# Patient Record
(demographics unavailable — no encounter records)

---

## 2018-01-01 NOTE — PHYSICIAN PROGRESS NOTE
DAILY NOTE



Name: RICARDO DOLAN                               Medical Record Number: [id number]

Note Date: 2018                             Date/Time: 2018 09:35:00



No Spells overnight



DOL: 8    Pos-Mens Age: 34wk 1d    Birth Gest: 33wk 0d      : 2018

Birth Weight: 1700 (gms)



DAILY PHYSICAL EXAM

Todays Weight: 1668 (gms)         Chg 24 hrs: --      Chg 7 days: --

Head Circ: 27.5 (cm)               Date: 2018    Change: -2.5 (cm)



Temperature   Heart Rate Resp Rate  BP - Sys   BP - Canas  BP - Mean  O2 Sats

98.9          156        40         69         35         46         99

Intensive cardiac and respiratory monitoring, continuous and/or frequent vital

sign monitoring.



Bed Type:      Open Crib

General:       The infant is alert and active.

Head/Neck:     Anterior fontanelle is soft and flat. No oral lesions.

Chest:         Clear, equal breath sounds.

Heart:         Regular rate and rhythm, without murmur. Pulses are normal.

Abdomen:       Soft and flat. No hepatosplenomegaly. Normal bowel sounds.

Genitalia:     Normal external genitalia are present. Male

Extremities:   No deformities noted.  Normal range of motion for all

               extremities. Hips show no evidence of instability.

Neurologic:    Normal tone and activity.

Skin:          The skin is pink and well perfused.  No rashes, vesicles, or

               other lesions are noted.



MEDICATIONS

Active         Start Date Start Time      Stop Date  Dur(d) Comment

ADEK           2018                            4



RESPIRATORY SUPPORT

Respiratory Support      Start Date Stop Date  Dur(d) Comment

Room Air                 2018            4



PROCEDURES

Procedures          Start Date Stop Date  Dur(d)  Clinician      Comment

Procedures



Procedures



Procedures

UVC                 2018 6       Cheryl Cedillo   low lying

                                                  MD

Procedures

Volume Bolus        2018 1                      10mL/kg x 2.

                                                                 NS for

                                                                 metabolic

                                                                 acidosis



CULTURES

ACTIVE

Type            Date       Results        Organism       Comment:

Blood           2018 No Growth



INTAKE/OUTPUT

Fluid Type        Sinan/oz     Dex % Prot g/kg Prot g/100mL Amt  Comment

NeoSure           22                                      245





Number of Voids: 10



Total Output:

Stools: 5 Last Stool: 2018





NUTRITIONAL SUPPORT

Diagnosis                Start Date End Date

Nutritional Support      2018



History

33 weeker intubated in delivery room for poor resp effort. initial glucose <

20. D10 bolus given. Mother was on Mag

Plan

Continue feeds ad aj min 30mL q3H

RESPIRATORY DISTRESS SYNDROME

Diagnosis                Start Date End Date

Pulmonary Immaturity     2018



History

33 weeker born via stat . Inadequate  steroids. intubated in

delivery room for poor resp effort s/p Infasurf x 1. extubated day 2 to CPAP,

transitioned to nasal cannula and weaned to room air day 5

Plan

Wean to room air as tolerated

PREMATURITY

Diagnosis                Start Date End Date

Prematurity 5700-1540 gm 2018



History

33 weeker born via  for preeclampsia and NRFHT, floppy at delivery

with significant metabolic acidosis. Normal neuro exam after volume

resuscitation and correction of hypoglycemia.  loaded with caffeine on day1.

improved acidosis < 12 hours.

Plan

developmentally appropriate care

d/c scheduled bili checks

ready for d/c when at least 1800g

HEALTH MAINTENANCE

MATERNAL LABS

RPR/Serology: Non-Reactive HIV: Negative Rubella: Immune GBS: Unknown

HBsAg: Negative



 SCREENING

Date                 Comment

2018 Ordered



Parental Contact

Updated





_______________________________________

Good Carbajal MD

## 2018-01-01 NOTE — XRAY REPORT
FINAL REPORT



EXAM:  XR ABDOMEN 1V AP



HISTORY:  line placement 



TECHNIQUE:  KUB view(s) of abdomen. 



PRIORS:  None.



FINDINGS:  

Apparent umbilical venous catheter tip coiled over thoracolumbar

junction, with tip directed caudally and projected over L1 level,

which may need repositioning. 



No significant bowel dilatation, pneumatosis or apparent

pneumoperitoneum. 



No abnormal calcifications.  



Osseous structures grossly unremarkable. 



IMPRESSION:  

1. Umbilical venous catheter position as reported. 



2. Nonobstructive bowel gas pattern.

## 2018-01-01 NOTE — PHYSICIAN PROGRESS NOTE
DAILY NOTE



Name: RICARDO DOLAN                               Medical Record Number: [id number]

Note Date: 2018                             Date/Time: 2018 10:06:00



DOL: 6    Pos-Mens Age: 33wk 6d    Birth Gest: 33wk 0d      : 2018

Birth Weight: 1700 (gms)



DAILY PHYSICAL EXAM

Todays Weight: 1668 (gms)         Chg 24 hrs: --      Chg 7 days: --



Temperature   Heart Rate Resp Rate  BP - Sys   BP - Canas  BP - Mean  O2 Sats

98.8          157        46         64         32         42         98

Intensive cardiac and respiratory monitoring, continuous and/or frequent vital

sign monitoring.



Bed Type:      Open Crib

General:       The infant is alert and active.

Head/Neck:     Anterior fontanelle is soft and flat.

Chest:         Clear, equal breath sounds.

Heart:         Regular rate and rhythm, without murmur. Pulses are normal.

Abdomen:       Soft and flat. No hepatosplenomegaly. Normal bowel sounds.

Genitalia:     Normal external genitalia are present.

Extremities:   No deformities noted.

Neurologic:    Normal tone and activity.

Skin:          The skin is pink and well perfused.



MEDICATIONS

Active         Start Date Start Time      Stop Date  Dur(d) Comment

ADEK           2018                            2



RESPIRATORY SUPPORT

Respiratory Support      Start Date Stop Date  Dur(d) Comment

Room Air                 2018            2



PROCEDURES

Procedures          Start Date Stop Date  Dur(d)  Clinician      Comment

Procedures



Procedures



Procedures

U[facility]                 2018 6       Cheryl Cedillo   low lying

                                                  MD

Procedures

Volume Bolus        2018 1                      10mL/kg x 2.

                                                                 NS for

                                                                 metabolic

                                                                 acidosis



LABS

Liver Function  Time    T Bili  D Bili  Blood Type Pop  AST     ALT

18                6.80 mg/

GGT     LDH     NH3     Lactate



CULTURES

ACTIVE

Type            Date       Results        Organism       Comment:

Blood           2018 No Growth



INTAKE/OUTPUT

Fluid Type        Sinan/oz     Dex % Prot g/kg Prot g/100mL Amt  Comment

IV Fluids                    10                           23

NeoSure           22                                      233



Route: PO



PLANNED INTAKE

FLUID TYPE: NEOSURE

Sinan/oz   Dex %    Prot g/kg Prot g/100mL Amt  mL/feed feeds/day mL/hr mL/kg/da

22                                       240  30      8               143.88





Comment ad aj or EBM



Urine Amount: 59 mL   1.5 mL/kg/hr

Calculation: 24 hrs

Number of Voids: 7



Total Output:

   59 mL     1.5 mL/kg/hr             35.4 mL/kg/day

Calculation: 24 hrs

Stools: 6





NUTRITIONAL SUPPORT

Diagnosis                Start Date End Date

Nutritional Support      2018



History

33 weeker intubated in delivery room for poor resp effort. initial glucose <

20. D10 bolus given. Mother was on Mag

Assessment

tolerated feeds withut any issues overnight

Plan

Increase feeds ad aj min 30mL q3H

RESPIRATORY DISTRESS SYNDROME

Diagnosis                Start Date End Date

Respiratory Distress     2018

Syndrome

Pulmonary Immaturity     2018



History

33 weeker born via stat . Inadequate  steroids. intubated in

delivery room for poor resp effort s/p Infasurf x 1. extubated day 2 to CPAP,

transitioned to nasal cannula and weaned to room air day 5

Assessment

weaned to room air and tolerated well. No events

Plan

Wean to room air as tolerated

[id number]

Diagnosis                Start Date End Date

Haysrt-onrjupu-logpfrysh 2018



History

33 weeker born via  for preeclampsia and NRFHT, floppy at delivery

with significant metabolic acidosis- base def -17. : CBCd , no left shift.

improved clinical status after volume resuscitation, base deficit improved to

-7. Blood cx neg. Off antibiotics and stable clinical status

Plan

F/U bld cx till final

PREMATURITY

Diagnosis                Start Date End Date

Prematurity 7861-2345 gm 2018



History

33 weeker born via  for preeclampsia and NRFHT, floppy at delivery

with significant metabolic acidosis. Normal neuro exam after volume

resuscitation and correction of hypoglycemia.  loaded with caffeine on day1.

improved acidosis < 12 hours.

Assessment

bili this am 6.7

Plan

developmentally appropriate care

daily TCB and send serum if >8.5

HEALTH MAINTENANCE

MATERNAL LABS

RPR/Serology: Non-Reactive HIV: Negative Rubella: Immune GBS: Unknown

HBsAg: Negative



 SCREENING

Date                 Comment

2018 Ordered



Parental Contact

Updated  - still admitted. called unit last night





_______________________________________

Cheryl Cedillo MD

## 2018-01-01 NOTE — PHYSICIAN PROGRESS NOTE
DAILY NOTE



Name: RICARDO DOLAN                               Medical Record Number: [id number]

Note Date: 2018                             Date/Time: 2018 10:05:00



No Spells overnight



DOL: 10   Pos-Mens Age: 34wk 3d    Birth Gest: 33wk 0d      : 2018

Birth Weight: 1700 (gms)



DAILY PHYSICAL EXAM

Todays Weight: 1697 (gms)         Chg 24 hrs: --      Chg 7 days: --

Head Circ: 29 (cm)                 Date: 2018    Change: 0 (cm)



Temperature   Heart Rate Resp Rate  BP - Sys   BP - Canas  BP - Mean  O2 Sats

98.3          159        45         78         30         48         96

Intensive cardiac and respiratory monitoring, continuous and/or frequent vital

sign monitoring.



Bed Type:      Open Crib

General:       The infant is alert and active.

Head/Neck:     Anterior fontanelle is soft and flat. No oral lesions.

Chest:         Clear, equal breath sounds.

Heart:         Regular rate and rhythm, without murmur. Pulses are normal.

Abdomen:       Soft and flat. No hepatosplenomegaly. Normal bowel sounds.

Genitalia:     Normal external genitalia are present.

Extremities:   No deformities noted.  Normal range of motion for all

               extremities. Hips show no evidence of instability.

Neurologic:    Normal tone and activity.

Skin:          The skin is pink and well perfused.  No rashes, vesicles, or

               other lesions are noted.



MEDICATIONS

Active         Start Date Start Time      Stop Date  Dur(d) Comment

ADEK           2018                            6



RESPIRATORY SUPPORT

Respiratory Support      Start Date Stop Date  Dur(d) Comment

Room Air                 2018            6



PROCEDURES

Procedures          Start Date Stop Date  Dur(d)  Clinician      Comment

Procedures



Procedures



Procedures

UVC                 2018 6       Cheryl Cedillo   low lying

                                                  MD

Procedures

Volume Bolus        2018 1                      10mL/kg x 2.

                                                                 NS for

                                                                 metabolic

                                                                 acidosis



LABS

Liver Function  Time    T Bili  D Bili  Blood Type Pop  AST     ALT

18                4.90 mg/

GGT     LDH     NH3     Lactate



CULTURES

ACTIVE

Type            Date       Results        Organism       Comment:

Blood           2018 No Growth



INTAKE/OUTPUT

Fluid Type        Sinan/oz     Dex % Prot g/kg Prot g/100mL Amt  Comment

NeoSure           22                                      243





Number of Voids: 10



Total Output:

Stools: 6 Last Stool: 2018





NUTRITIONAL SUPPORT

Diagnosis                Start Date End Date

Nutritional Support      2018



History

33 weeker intubated in delivery room for poor resp effort. initial glucose <

20. D10 bolus given. Mother was on Mag

Plan

Continue feeds ad aj min 34mL q3H (160cc/kg/day)

RESPIRATORY DISTRESS SYNDROME

Diagnosis                Start Date End Date

Pulmonary Immaturity     2018



History

33 weeker born via stat . Inadequate  steroids. intubated in

delivery room for poor resp effort s/p Infasurf x 1. extubated day 2 to CPAP,

transitioned to nasal cannula and weaned to room air day 5

Plan

Wean to room air as tolerated

PREMATURITY

Diagnosis                Start Date End Date

Prematurity 6979-9568 gm 2018



History

33 weeker born via  for preeclampsia and NRFHT, floppy at delivery

with significant metabolic acidosis. Normal neuro exam after volume

resuscitation and correction of hypoglycemia.  loaded with caffeine on day1.

improved acidosis < 12 hours.

Plan

developmentally appropriate care

d/c scheduled bili checks

ready for d/c when at least 1800g

HEALTH MAINTENANCE

MATERNAL LABS

RPR/Serology: Non-Reactive HIV: Negative Rubella: Immune GBS: Unknown

HBsAg: Negative



 SCREENING

Date                 Comment

2018 Ordered



Parental Contact

Updated





_______________________________________

Good Carbajal MD

## 2018-01-01 NOTE — PHYSICIAN PROGRESS NOTE
DAILY NOTE



Name: RICARDO DOALN                               Medical Record Number: [id number]

Note Date: 2018                             Date/Time: 2018 10:32:00



DOL: 4    Pos-Mens Age: 33wk 4d    Birth Gest: 33wk 0d      : 2018

Birth Weight: 1700 (gms)



DAILY PHYSICAL EXAM

Todays Weight: 1614 (gms)         Chg 24 hrs: --      Chg 7 days: --



Temperature   Heart Rate Resp Rate  BP - Sys   BP - Canas  BP - Mean  O2 Sats

98.8          136        36         64         36         45         95

Intensive cardiac and respiratory monitoring, continuous and/or frequent vital

sign monitoring.



Bed Type:      Radiant Warmer

General:       The infant is alert and active.

Head/Neck:     Anterior fontanelle is soft and flat. NC in place

Chest:         Clear, equal breath sounds.

Heart:         Regular rate and rhythm, without murmur. Pulses are normal.

Abdomen:       Distended abdomen, soft, normal bowel sounds. No

               hepatosplenomegaly. Normal bowel sounds.

Genitalia:     Normal external genitalia are present.

Extremities:   No deformities noted.

Neurologic:    Normal tone and activity.

Skin:          The skin is pink and well perfused.



RESPIRATORY SUPPORT

Respiratory Support      Start Date Stop Date  Dur(d) Comment

High Flow Nasal Cannula  2018 3

delivering CPAP

Nasal Cannula            2018            1



SETTINGS FOR NASAL CANNULA

FiO2           Flow (lpm)

0.21           1



SETTINGS FOR HIGH FLOW NASAL CANNULA DELIVERING CPAP

FiO2      Flow (lpm)

0.21      2



PROCEDURES

Procedures          Start Date Stop Date  Dur(d)  Clinician      Comment

Procedures

UVC                 2018            5       Cheryl Cedillo   low lying

                                                  MD

Procedures

Volume Bolus        2018            5                      10mL/kg x 2.

                                                                 NS for

                                                                 metabolic

                                                                 acidosis



CULTURES

ACTIVE

Type            Date       Results        Organism       Comment:

Blood           2018 No Growth



INTAKE/OUTPUT

Fluid Type        Sinan/oz     Dex % Prot g/kg Prot g/100mL Amt  Comment

IV Fluids                    10                           94

Other - IV                                                12   meds and flushes

NeoSure           22                                      112



Weight Used for calculations: 1700 grams

Route: PO



PLANNED INTAKE

FLUID TYPE: IV FLUIDS

Sinan/oz   Dex %    Prot g/kg Prot g/100mL Amt  mL/feed feeds/day mL/hr mL/kg/da

         10                              108                    4.5   63.53

FLUID TYPE: NEOSURE

Sinan/oz   Dex %    Prot g/kg Prot g/100mL Amt  mL/feed feeds/day mL/hr mL/kg/da

22                                       120                          70.59





Comment or EBM

FLUID TYPE: SALINE - 1/2 NORMAL

Sinan/oz   Dex %    Prot g/kg Prot g/100mL Amt  mL/feed feeds/day mL/hr mL/kg/da

                                         12                     0.5   7.06



Urine Amount: 108 mL   2.6 mL/kg/hr

Calculation: 24 hrs



Total Output:

   108 mL    2.6 mL/kg/hr             63.5 mL/kg/day

Calculation: 24 hrs

Stools: 4





NUTRITIONAL SUPPORT

Diagnosis                Start Date End Date

Nutritional Support      2018



History

33 weeker intubated in delivery room for poor resp effort. initial glucose <

20. D10 bolus given. Mother was on Mag

Assessment

Has been tolerating feeds. this am abdominal girth increased by 2cm, however

soft, non tender, active bowel sounds

Plan

Continue feeds 20mL q4. Neosure /EBM. PO/NG - No increase today

IVF @ 140ml/kg/day

Monitor closely

RESPIRATORY DISTRESS SYNDROME

Diagnosis                Start Date End Date

Respiratory Distress     2018

Syndrome



History

33 weeker born via stat . Inadequate  steroids. intubated in

delivery room for poor resp effort s/p Infasurf x 1

Assessment

stable on 1L NC 21%

Plan

Monitor and wean HFNC as tolerated

[id number]

Diagnosis                Start Date End Date

Pmfutp-uhogehl-iksurooyg 2018



History

33 weeker born via  for preeclampsia and NRFHT, floppy at delivery

with significant metabolic acidosis- base def -17. : CBCd , no left shift.

improved clinical status after volume resuscitation, base deficit improved to

-7

Assessment

Amp and gent dced and stable

Plan

F/U bld cx till final

PREMATURITY

Diagnosis                Start Date End Date

Prematurity 0440-4111 gm 2018



History

33 weeker born via  for preeclampsia and NRFHT, floppy at delivery

with significant metabolic acidosis. Normal neuro exam after volume

resuscitation and correction of hypoglycemia.  loaded with caffeine on day1.

improved acidosis < 12 hours.

Assessment

tcb: 7.2

Plan

developmentally appropriate care

daily TCB and send serum if >8.5

HEALTH MAINTENANCE

MATERNAL LABS

RPR/Serology: Non-Reactive HIV: Negative Rubella: Immune GBS: Unknown

HBsAg: Negative



 SCREENING

Date                 Comment

2018 Ordered



Parental Contact

Updated





_______________________________________

Cheryl Cedillo MD

## 2018-01-01 NOTE — PHYSICIAN PROGRESS NOTE
DAILY NOTE



Name: RICARDO DOLAN                               Medical Record Number: [id number]

Note Date: 2018                             Date/Time: 2018 10:00:00



DOL: 5    Pos-Mens Age: 33wk 5d    Birth Gest: 33wk 0d      : 2018

Birth Weight: 1700 (gms)



DAILY PHYSICAL EXAM

Todays Weight: Deferred (gms)     Chg 24 hrs: --      Chg 7 days: --



Temperature   Heart Rate Resp Rate  BP - Sys   BP - Canas  BP - Mean  O2 Sats

98.5          132        47         58         34         42         94

Intensive cardiac and respiratory monitoring, continuous and/or frequent vital

sign monitoring.



Bed Type:      Radiant Warmer

General:       The infant is alert and active.

Head/Neck:     Anterior fontanelle is soft and flat. NC in place

Chest:         Clear, equal breath sounds.

Heart:         Regular rate and rhythm, without murmur. Pulses are normal.

Abdomen:       Soft and flat. No hepatosplenomegaly. Normal bowel sounds.

Genitalia:     Normal external genitalia are present.

Extremities:   No deformities noted.

Neurologic:    Normal tone and activity.

Skin:          The skin is pink and well perfused.



MEDICATIONS

Active         Start Date Start Time      Stop Date  Dur(d) Comment

ADEK           2018                            1



RESPIRATORY SUPPORT

Respiratory Support      Start Date Stop Date  Dur(d) Comment

Nasal Cannula            2018            2



SETTINGS FOR NASAL CANNULA

FiO2           Flow (lpm)

0.21           0.75



PROCEDURES

Procedures          Start Date Stop Date  Dur(d)  Clinician      Comment

Procedures

[facility]                 2018            6       Cheryl Cedillo   low lying

                                                  MD

Procedures

Volume Bolus        2018            6                      10mL/kg x 2.

                                                                 NS for

                                                                 metabolic

                                                                 acidosis



LABS

Liver Function  Time    T Bili  D Bili  Blood Type Pop  AST     ALT

18                6.80 mg/

GGT     LDH     NH3     Lactate



CULTURES

ACTIVE

Type            Date       Results        Organism       Comment:

Blood           2018 No Growth



INTAKE/OUTPUT

Fluid Type        Sinan/oz     Dex % Prot g/kg Prot g/100mL Amt  Comment

IV Fluids                    10                           112

NeoSure           22                                      140



Weight Used for calculations: 1614 grams

Route: PO



PLANNED INTAKE

FLUID TYPE: NEOSURE

Sinan/oz   Dex %    Prot g/kg Prot g/100mL Amt  mL/feed feeds/day mL/hr mL/kg/da

22                                       200  25      8               123.92





Comment ad aj or EBM



Urine Amount: 139 mL   3.6 mL/kg/hr

Calculation: 24 hrs



Total Output:

   139 mL    3.6 mL/kg/hr             86.1 mL/kg/day

Calculation: 24 hrs

Stools: 6





NUTRITIONAL SUPPORT

Diagnosis                Start Date End Date

Nutritional Support      2018



History

33 weeker intubated in delivery room for poor resp effort. initial glucose <

20. D10 bolus given. Mother was on Mag

Assessment

tolerated feeds withut any issues overnight

Plan

Increase feeds ad aj min 25mL q3H

RESPIRATORY DISTRESS SYNDROME

Diagnosis                Start Date End Date

Respiratory Distress     2018

Syndrome



History

33 weeker born via stat . Inadequate  steroids. intubated in

delivery room for poor resp effort s/p Infasurf x 1

Assessment

weaning resp support

Plan

Wean to room air as tolerated

[id number]

Diagnosis                Start Date End Date

Ebbbgg-phfmzjx-pyumuifzm 2018



History

33 weeker born via  for preeclampsia and NRFHT, floppy at delivery

with significant metabolic acidosis- base def -17. : CBCd , no left shift.

improved clinical status after volume resuscitation, base deficit improved to

-7

Plan

F/U bld cx till final

PREMATURITY

Diagnosis                Start Date End Date

Prematurity 4912-8474 gm 2018



History

33 weeker born via  for preeclampsia and NRFHT, floppy at delivery

with significant metabolic acidosis. Normal neuro exam after volume

resuscitation and correction of hypoglycemia.  loaded with caffeine on day1.

improved acidosis < 12 hours.

Assessment

bili this am 6.8

Plan

developmentally appropriate care

daily TCB and send serum if >8.5

HEALTH MAINTENANCE

MATERNAL LABS

RPR/Serology: Non-Reactive HIV: Negative Rubella: Immune GBS: Unknown

HBsAg: Negative



 SCREENING

Date                 Comment

2018 Ordered



Parental Contact

Updated





_______________________________________

Cheryl Cedillo MD

## 2018-01-01 NOTE — XRAY REPORT
FINAL REPORT



EXAM:  XR CHEST 1V AP



HISTORY:  tube placement 



TECHNIQUE:  Single, portable chest x-ray.



PRIORS:  None.



FINDINGS:  

ET tube tip projects approximately 1 cm above the truong.

Additional umbilical catheter will be further evaluated on

abdomen radiograph. 



Cardiothymic silhouette within normal limits. 



Lungs show increased interstitial opacities in diffuse hazy

opacification bilaterally. No apparent pneumothorax. 



IMPRESSION:  

1. ET tube position as reported. 



2. Findings which may represent retained fluid, interstitial

edema or TTN. Correlate clinically.

## 2018-01-01 NOTE — PHYSICIAN PROGRESS NOTE
DAILY NOTE



Name: RICARDO DOLAN                               Medical Record Number: [id number]

Note Date: 2018                             Date/Time: 2018 11:31:00



DOL: 16   Pos-Mens Age: 35wk 2d    Birth Gest: 33wk 0d      : 2018

Birth Weight: 1700 (gms)



DAILY PHYSICAL EXAM

Todays Weight: 1807 (gms)         Chg 24 hrs: --      Chg 7 days: 110

Head Circ: 30.5 (cm)               Date: 2018    Change: 1.5 (cm)

Length: 44.5 (cm)   Change: 1.3 (cm)



Temperature   Heart Rate Resp Rate  BP - Sys   BP - Canas  BP - Mean  O2 Sats

98.4          170        35         76         50         58         96

Intensive cardiac and respiratory monitoring, continuous and/or frequent vital

sign monitoring.



Bed Type:      Open Crib

General:       The infant is alert and active.

Head/Neck:     Anterior fontanelle is soft and flat. NG in place

Chest:         Clear, equal breath sounds.

Heart:         Regular rate and rhythm, without murmur. Pulses are normal.

Abdomen:       Soft and flat. No hepatosplenomegaly. Normal bowel sounds.

Genitalia:     Normal external genitalia are present.

Extremities:   No deformities noted.

Neurologic:    Normal tone and activity.

Skin:          The skin is pink and well perfused.



MEDICATIONS

Active         Start Date Start Time      Stop Date  Dur(d) Comment

Multivitamins  2018                            4

with Iron



RESPIRATORY SUPPORT

Respiratory Support      Start Date Stop Date  Dur(d) Comment

Room Air                 2018            12



PROCEDURES

Procedures          Start Date Stop Date  Dur(d)  Clinician      Comment

Procedures



Procedures



Procedures

[facility]                 2018 6       Cheryl Cedillo   low lying

                                                  MD

Procedures

Volume Bolus        2018 1                      10mL/kg x 2.

                                                                 NS for

                                                                 metabolic

                                                                 acidosis



CULTURES

ACTIVE

Type            Date       Results        Organism       Comment:

Blood           2018 No Growth



INTAKE/OUTPUT

Fluid Type        Sinan/oz     Dex % Prot g/kg Prot g/100mL Amt  Comment

NeoSure                                                 272



Route: NG/PO



PLANNED INTAKE

FLUID TYPE: NEOSURE

Sinan/oz   Dex %    Prot g/kg Prot g/100mL Amt  mL/feed feeds/day mL/hr mL/kg/da

22                                       272  34      8               150.53



Number of Voids: 8



Total Output:

Stools: 4 Last Stool: 2018





NUTRITIONAL SUPPORT

Diagnosis                Start Date End Date

Nutritional Support      2018



History

33 weeker intubated in delivery room for poor resp effort. initial glucose <

20. D10 bolus given. Mother was on Mag

Assessment

tolerating feeds. 100% PO over the past 24 hours

Plan

Continue feeds ad aj min 34mL q3H

RESPIRATORY DISTRESS SYNDROME

Diagnosis                Start Date End Date

Pulmonary Immaturity     2018



History

33 weeker born via stat . Inadequate  steroids. intubated in

delivery room for poor resp effort s/p Infasurf x 1. extubated day 2 to CPAP,

transitioned to nasal cannula and weaned to room air day 5

Assessment

No events over 24 hours. Last andrés 

Plan

Continue to monitor

PREMATURITY

Diagnosis                Start Date End Date

Prematurity 7434-1423 gm 2018



History

33 weeker born via  for preeclampsia and NRFHT, floppy at delivery

with significant metabolic acidosis. Normal neuro exam after volume

resuscitation and correction of hypoglycemia.  loaded with caffeine on day1.

improved acidosis < 12 hours.

Assessment

stable in room air. working on PO feeds

Plan

developmentally appropriate care

HEALTH MAINTENANCE

MATERNAL LABS

RPR/Serology: Non-Reactive HIV: Negative Rubella: Immune GBS: Unknown

HBsAg: Negative



 SCREENING

Date                 Comment

2018 Ordered



HEARING SCREEN

Date                Type      Results   Comment

2018 Done               Passed



IMMUNIZATION

Date                Type                Comment

2018 Ordered  Hepatitis B



Parental Contact

Updated





_______________________________________

Cheryl Cedillo MD

## 2018-01-01 NOTE — PHYSICIAN PROGRESS NOTE
DAILY NOTE



Name: RICARDO DOLAN                               Medical Record Number: [id number]

Note Date: 2018                             Date/Time: 2018 10:15:00



DOL: 2    Pos-Mens Age: 33wk 2d    Birth Gest: 33wk 0d      : 2018

Birth Weight: 1700 (gms)



DAILY PHYSICAL EXAM

Todays Weight: 1527 (gms)         Chg 24 hrs: --      Chg 7 days: --



Temperature   Heart Rate Resp Rate  BP - Sys   BP - Canas  BP - Mean  O2 Sats

98.4          123        57         69         39         49         99

Intensive cardiac and respiratory monitoring, continuous and/or frequent vital

sign monitoring.



Bed Type:      Radiant Warmer

General:       The infant is alert and active.

Head/Neck:     Anterior fontanelle is soft and flat. JAMES cannula in place

Chest:         Clear, equal breath sounds.

Heart:         Regular rate and rhythm, without murmur. Pulses are normal.

Abdomen:       Soft and flat. No hepatosplenomegaly. Normal bowel sounds.

Genitalia:     Normal external genitalia are present.

Extremities:   No deformities noted.

Neurologic:    Normal tone and activity.

Skin:          The skin is pink and well perfused.



MEDICATIONS

Active         Start Date Start Time      Stop Date  Dur(d) Comment

Ampicillin     2018                            3

Gentamicin     2018                            3



RESPIRATORY SUPPORT

Respiratory Support      Start Date Stop Date  Dur(d) Comment

Nasal CPAP               2018 2

High Flow Nasal Cannula  2018            1

delivering CPAP



SETTINGS FOR NASAL CPAP

FiO2           CPAP

0.21           6



SETTINGS FOR HIGH FLOW NASAL CANNULA DELIVERING CPAP

FiO2      Flow (lpm)

0.21      3



PROCEDURES

Procedures          Start Date Stop Date  Dur(d)  Clinician      Comment

Procedures

[facility]                 2018            3       Cheryl Cedillo   low lying

                                                  MD

Procedures

Volume Bolus        2018            3                      10mL/kg x 2.

                                                                 NS for

                                                                 metabolic

                                                                 acidosis



LABS

CBC      Time  WBC     Hgb     Hct     Plts    Segs    Bands   Lymph   Mono

18 19:45 8.1 K/mm15.3 gm/44.3 %  244 K/mm59.0 %  0 %     30.0 %  10.0 %

Eos     Baso    Imm     nRBC    Retic

        1.0 %           20.0 %



Chem1    Time    Na      K       Cl      CO2     BUN     Cr      Glu

18 19:45   145 mmol3.5     107.3   17 mmol/21 mg/dL        56 mg/dL

BS Glu  Ca

        8.4 mg/d



Liver Function  Time    T Bili  D Bili  Blood Type Pop  AST     ALT

18        19:45   3.60 mg/                           171 unit62 units

GGT     LDH     NH3     Lactate



Chem2    Time    iCa     Osm     Phos    Mg      TG      Alk Phos T Prot

18 19:45                                           262 units5.9 g/dL

Alb     Pre Alb

3.5 g/dL



Infectious Disease Time  CRP     HepA Ab  HepB cAb HepB sAg HepC PCR  HepC Ab

18           19:45 0.70 mg/



CULTURES

ACTIVE

Type            Date       Results        Organism       Comment:

Blood           2018 Pending



INTAKE/OUTPUT

Fluid Type        Sinan/oz     Dex % Prot g/kg Prot g/100mL Amt  Comment

IV Fluids                    10                           147

Other - IV                                                11   meds and flushes



Route: OG/PO



PLANNED INTAKE

FLUID TYPE: NEOSURE

Sinan/oz   Dex %    Prot g/kg Prot g/100mL Amt  mL/feed feeds/day mL/hr mL/kg/da

22                                       80   10      8               52.39





Comment or EBM

FLUID TYPE: IV FLUIDS

Sinan/oz   Dex %    Prot g/kg Prot g/100mL Amt  mL/feed feeds/day mL/hr mL/kg/da

         10                              96                     4     62.87

FLUID TYPE: SALINE - 1/2 NORMAL

Sinan/oz   Dex %    Prot g/kg Prot g/100mL Amt  mL/feed feeds/day mL/hr mL/kg/da

                                         12                     0.5   7





NUTRITIONAL SUPPORT

Diagnosis                Start Date End Date

Nutritional Support      2018



History

33 weeker intubated in delivery room for poor resp effort. initial glucose <

20. D10 bolus given. Mother was on Mag

Assessment

extubated and stable, alert and active

Plan

Initiate feeds 10mL/q3. Neosure /EBM. PO/NG

IVF @ 120ml/kg/day

RESPIRATORY DISTRESS SYNDROME

Diagnosis                Start Date End Date

Respiratory Distress     2018

Syndrome



History

33 weeker born via stat . Inadequate  steroids. intubated in

delivery room for poor resp effort s/p Infasurf x 1

Assessment

extubated to CPAP on 21%FiO2 and transitioned to HFNC this am

Plan

Monitor and wean HFNC as tolerated

[id number]

Diagnosis                Start Date End Date

Afwxic-xsqgbzc-iitiyvrch 2018



History

33 weeker born via  for preeclampsia and NRFHT, floppy at delivery

with significant metabolic acidosis- base def -17. 1: CBCd , no left shift.

improved clinical status after volume resuscitation, base deficit improved to

-7

Assessment

stable clinical status, bld cx pending. repeat cbcd and crp benign

Plan

D/C amp and gent if cx negative after 48 hours

F/U bld cx

PREMATURITY

Diagnosis                Start Date End Date

Prematurity 7095-4860 gm 2018



History

33 weeker born via  for preeclampsia and NRFHT, floppy at delivery

with significant metabolic acidosis. Normal neuro exam after volume

resuscitation and correction of hypoglycemia.  loaded with caffeine on day1.

improved acidosis < 12 hours.

Plan

developmentally appropriate care

daily TCB and send serum if >8.5

HEALTH MAINTENANCE

MATERNAL LABS

RPR/Serology: Non-Reactive HIV: Negative Rubella: Immune GBS: Unknown

HBsAg: Negative



 SCREENING

Date                 Comment

2018 Ordered



Parental Contact

Updated





_______________________________________

Cheryl Cedillo MD

## 2018-01-01 NOTE — PHYSICIAN PROGRESS NOTE
DAILY NOTE



Name: RICARDO DOLAN                               Medical Record Number: [id number]

Note Date: 2018                             Date/Time: 2018 10:09:00



DOL: 3    Pos-Mens Age: 33wk 3d    Birth Gest: 33wk 0d      : 2018

Birth Weight: 1700 (gms)



DAILY PHYSICAL EXAM

Todays Weight: Deferred (gms)     Chg 24 hrs: --      Chg 7 days: --



Temperature   Heart Rate Resp Rate  BP - Sys   BP - Canas  BP - Mean  O2 Sats

97.8          127        58         59         32         39         100

Intensive cardiac and respiratory monitoring, continuous and/or frequent vital

sign monitoring.



Bed Type:      Radiant Warmer

General:       The infant is alert and active.

Head/Neck:     Anterior fontanelle is soft and flat. NC in place

Chest:         Clear, equal breath sounds.

Heart:         Regular rate and rhythm, without murmur. Pulses are normal.

Abdomen:       Soft and flat. No hepatosplenomegaly. Normal bowel sounds.

Genitalia:     Normal external genitalia are present.

Extremities:   No deformities noted.

Neurologic:    Normal tone and activity.

Skin:          The skin is pink and well perfused.



MEDICATIONS

Active         Start Date Start Time      Stop Date  Dur(d) Comment

Ampicillin     2018 4

Gentamicin     2018 4



RESPIRATORY SUPPORT

Respiratory Support      Start Date Stop Date  Dur(d) Comment

High Flow Nasal Cannula  2018            2

delivering CPAP



SETTINGS FOR HIGH FLOW NASAL CANNULA DELIVERING CPAP

FiO2      Flow (lpm)

0.21      2



PROCEDURES

Procedures          Start Date Stop Date  Dur(d)  Clinician      Comment

Procedures

[facility]                 2018            4       Cheryl Cedillo   low lying

                                                  MD

Procedures

Volume Bolus        2018            4                      10mL/kg x 2.

                                                                 NS for

                                                                 metabolic

                                                                 acidosis



CULTURES

ACTIVE

Type            Date       Results        Organism       Comment:

Blood           2018 No Growth



INTAKE/OUTPUT

Fluid Type        Sinan/oz     Dex % Prot g/kg Prot g/100mL Amt  Comment

IV Fluids                    10                           125

Other - IV                                                15   meds and flushes

NeoSure           22                                      63



Weight Used for calculations: 1700 grams

Route: OG



PLANNED INTAKE

FLUID TYPE: SALINE - 1/2 NORMAL

Sinan/oz   Dex %    Prot g/kg Prot g/100mL Amt  mL/feed feeds/day mL/hr mL/kg/da

                                         12                     0.5   7.06

FLUID TYPE: NEOSURE

Sinan/oz   Dex %    Prot g/kg Prot g/100mL Amt  mL/feed feeds/day mL/hr mL/kg/da

22                                       160  20      8               94.12





Comment or EBM

FLUID TYPE: IV FLUIDS

Sinan/oz   Dex %    Prot g/kg Prot g/100mL Amt  mL/feed feeds/day mL/hr mL/kg/da

         10                              72                     3     42.35



Urine Amount: 122 mL   3.0 mL/kg/hr

Calculation: 24 hrs



Total Output:

   122 mL    3 mL/kg/hr               71.8 mL/kg/day

Calculation: 24 hrs

Stools: 5





NUTRITIONAL SUPPORT

Diagnosis                Start Date End Date

Nutritional Support      2018



History

33 weeker intubated in delivery room for poor resp effort. initial glucose <

20. D10 bolus given. Mother was on Mag

Assessment

extubated and stable, alert and active

Plan

Initiate feeds 20mL q3. Neosure /EBM. PO/NG

IVF @ 140ml/kg/day

RESPIRATORY DISTRESS SYNDROME

Diagnosis                Start Date End Date

Respiratory Distress     2018

Syndrome



History

33 weeker born via stat . Inadequate  steroids. intubated in

delivery room for poor resp effort s/p Infasurf x 1

Assessment

stable on 2L NC 21%

Plan

Monitor and wean HFNC as tolerated

[id number]

Diagnosis                Start Date End Date

Scdopo-txlhbvv-axqffuecy 2018



History

33 weeker born via  for preeclampsia and NRFHT, floppy at delivery

with significant metabolic acidosis- base def -17. : CBCd , no left shift.

improved clinical status after volume resuscitation, base deficit improved to

-7

Assessment

stable clinical status, bld cx neg after 48 hours

Plan

D/C amp and gent

F/U bld cx till final

PREMATURITY

Diagnosis                Start Date End Date

Prematurity 3396-0721 gm 2018



History

33 weeker born via  for preeclampsia and NRFHT, floppy at delivery

with significant metabolic acidosis. Normal neuro exam after volume

resuscitation and correction of hypoglycemia.  loaded with caffeine on day1.

improved acidosis < 12 hours.

Plan

developmentally appropriate care

daily TCB and send serum if >8.5

HEALTH MAINTENANCE

MATERNAL LABS

RPR/Serology: Non-Reactive HIV: Negative Rubella: Immune GBS: Unknown

HBsAg: Negative



 SCREENING

Date                 Comment

2018 Ordered



Parental Contact

Updated





_______________________________________

Cheryl Cedillo MD

## 2018-01-01 NOTE — HISTORY AND PHYSICAL REPORT
ADMISSION NOTE



Name: RICARDO DOLAN                               Medical Record Number: [id number]

Admit Date: 2018   Time: 19:30              Date/Time: 2018 22:13:19



This 1700 gram Birth Wt 33 week gestational age black male  was born to a 17

yr.  mom .



Admit Type: Following Delivery



Birth Hospital: [facility]

HOSPITALIZATION SUMMARY

Hospital Name                       Adm Date   Adm Time   DC Date    DC Time

[facility]    2018 19:30



MATERNAL HISTORY

Moms Age: 17  Race: Black    Blood Type: AB Pos

      P: 0

RPR/Serology: Non-Reactive    HIV: Negative  Rubella: Immune

GBS: Unknown                  HBsAg: Negative

EDC - OB: 2018          Prenatal Care: Yes  Moms MR#: [id number]

Moms First Name: Alyson Woods Last Name: Miguelangel



Complications during Pregnancy, Labor or Delivery: Yes



Name                Comment

NRFHT

Pre-eclampsia



Maternal Steroids: Yes



Most Recent Dose: Date: 2018 Time: 18:10 Next Recent Dose: Date:  Time:



Medications During Pregnancy or Labor: Yes



Name                                    Comment

Cefazolin

Magnesium Sulfate



DELIVERY

YOB: 2018 Time of Birth: 19:08 Live Births: Single

Birth Order: Single ROM Prior to Delivery: No Fluid at Delivery: Clear

Birth Hospital: [facility] Presentation: Vertex

Anesthesia: Spinal Delivery Type:  Section



Procedures/Medications at Delivery:NP/OP Suctioning,



                    Start Date Stop Date  Clinician      Comment

Intubation          2018            KENDRICK MARKS MD    RT

Positive Pressure Ve2018 KENDRICK MARKS MD    RT



APGAR:  1 min: 0 5  min: 4 10  min: 6



Others at Delivery:       Resuscitation team



Labor and Delivery Comment:

Intubatedin delivery room for poor tone, cyanosis and HR < 100



ADMISSION PHYSICAL EXAM

Birth Gestation: 33wk 0d Gender: Male Birth Weight: 1700 (gms) 11-25%tile

Head Circ: 30 (cm) 26-50%tile Length: 43.2 (cm) 26-50%tile



Temperature   Heart Rate Resp Rate  BP - Sys   BP - Canas  BP - Mean  O2 Sats

97.8          154        62         58         24         27         92



Intensive cardiac and respiratory monitoring, continuous and/or frequent vital

sign monitoring.



Bed Type:      Radiant Warmer



               touch, grimacing

Head/Neck:     Anterior fontanelle is soft and flat. Intubated

Chest:         Coarse, equal breath sounds.

Heart:         Regular rate and rhythm, without murmur. Pulses are normal.

Abdomen:       Soft and flat. No hepatosplenomegaly. Normal bowel sounds.

Genitalia:     Normal external genitalia are present.

Extremities:   No deformities noted.

Neurologic:    Normal tone and activity.

Skin:          The skin is pink and well perfused.

MEDICATIONS

Active         Start Date Start Time      Stop Date  Dur(d) Comment

Infasurf       2018            Once 2018 1

Erythromycin   2018            Once 2018 1

Eye Ointment

Vitamin K      2018            Once 2018 1

Ampicillin     2018                            1

Gentamicin     2018                            1

Caffeine       2018            Once 2018 1

Citrate



RESPIRATORY SUPPORT

Respiratory Support      Start Date Stop Date  Dur(d) Comment

Ventilator               2018            1



SETTINGS FOR VENTILATOR

Type     FiO2     Rate     PEEP     Vt

A/C      0.7      35       6        7



PROCEDURES

Procedures          Start Date Stop Date  Dur(d)  Clinician      Comment

Procedures



Procedures



Procedures

[facility]                 2018            1       james Figueroa lying

                                                  MD

Procedures

Volume Bolus        2018            1                      10mL/kg x 2.

                                                                 NS for

                                                                 metabolic

                                                                 acidosis



LABS

CBC      Time  WBC     Hgb     Hct     Plts    Segs    Bands   Lymph   Mono

18 20:58 19.9 K/m14.3 gm/43.1 %  193 K/mm

Eos     Baso    Imm     nRBC    Retic



CULTURES

ACTIVE

Type            Date       Results        Organism       Comment:

Blood           2018



INTAKE/OUTPUT

Route: NPO



PLANNED INTAKE

FLUID TYPE: SALINE - 1/2 NORMAL

Sinan/oz   Dex %    Prot g/kg Prot g/100mL Amt  mL/feed feeds/day mL/hr mL/kg/da

                                         12                     0.5   7.06

FLUID TYPE: IV FLUIDS

Sinan/oz   Dex %    Prot g/kg Prot g/100mL Amt  mL/feed feeds/day mL/hr mL/kg/da

         10                              124.8                  5.2   73.41





NUTRITIONAL SUPPORT

Diagnosis                Start Date End Date

Nutritional Support      2018



History

33 weeker intubated in delivery room for poor resp effort. initial glucose <

20. D10 bolus given. Mother was on Mag

Assessment

hypoglycemic

Plan

NPO

IVF @ 80ml/kg/day

monitor glucose

RESPIRATORY DISTRESS SYNDROME

Diagnosis                Start Date End Date

Respiratory Distress     2018

Syndrome



History

33 weeker born via stat . Inadequate  steroids. intubated in

delivery room for poor resp effort

Plan

CXR: mild RDS

Infasurf x 1

Monitor

Repeat CBG at 4am

[id number]

Diagnosis                Start Date End Date

Vvgrbw-emepszp-qrvlzebcx 2018



History

33 weeker born via  for preeclampsia and NRFHT, floppy at delivery

with significant metabolic acidosis

Plan

NS bolus x 2

BCD, blood cx

amp and gent for prophylaxis

PREMATURITY

Diagnosis                Start Date End Date

Prematurity 1050-5990 gm 2018



History

33 weeker born via  for preeclampsia and NRFHT, floppy at delivery

with significant metabolic acidosis

Plan

developmentally appropriate care

load with caffeine

HEALTH MAINTENANCE

MATERNAL LABS

RPR/Serology: Non-Reactive HIV: Negative Rubella: Immune GBS: Unknown

HBsAg: Negative



Parental Contact

Updated at the bedside





_______________________________________

Cheryl Cedillo MD

## 2018-01-01 NOTE — PHYSICIAN PROGRESS NOTE
DAILY NOTE



Name: RICARDO DOLAN                               Medical Record Number: [id number]

Note Date: 2018                             Date/Time: 2018 09:55:00



No Spells overnight



DOL: 9    Pos-Mens Age: 34wk 2d    Birth Gest: 33wk 0d      : 2018

Birth Weight: 1700 (gms)



DAILY PHYSICAL EXAM

Todays Weight: 1697 (gms)         Chg 24 hrs: 29      Chg 7 days: 170

Head Circ: 29 (cm)                 Date: 2018    Change: 1.5 (cm)



Temperature   Heart Rate Resp Rate  BP - Sys   BP - Canas  BP - Mean  O2 Sats

98.3          159        43         76         35         49         97

Intensive cardiac and respiratory monitoring, continuous and/or frequent vital

sign monitoring.



Bed Type:      Open Crib

General:       The infant is alert and active.

Head/Neck:     Anterior fontanelle is soft and flat. No oral lesions.

Chest:         Clear, equal breath sounds.

Heart:         Regular rate and rhythm, without murmur. Pulses are normal.

Abdomen:       Soft and flat. No hepatosplenomegaly. Normal bowel sounds.

Genitalia:     Normal external genitalia are present.

Extremities:   No deformities noted.  Normal range of motion for all

               extremities. Hips show no evidence of instability.

Neurologic:    Normal tone and activity.

Skin:          The skin is pink and well perfused.  No rashes, vesicles, or

               other lesions are noted.



MEDICATIONS

Active         Start Date Start Time      Stop Date  Dur(d) Comment

ADEK           2018                            5



RESPIRATORY SUPPORT

Respiratory Support      Start Date Stop Date  Dur(d) Comment

Room Air                 2018            5



PROCEDURES

Procedures          Start Date Stop Date  Dur(d)  Clinician      Comment

Procedures



Procedures



Procedures

[facility]                 2018 6       Cheryl Cedillo   low lying

                                                  MD

Procedures

Volume Bolus        2018 1                      10mL/kg x 2.

                                                                 NS for

                                                                 metabolic

                                                                 acidosis



CULTURES

ACTIVE

Type            Date       Results        Organism       Comment:

Blood           2018 No Growth



INTAKE/OUTPUT

Fluid Type        Sinan/oz     Dex % Prot g/kg Prot g/100mL Amt  Comment

NeoSure           22                                      242





Number of Voids: 8



Total Output:

Stools: 5 Last Stool: 2018





NUTRITIONAL SUPPORT

Diagnosis                Start Date End Date

Nutritional Support      2018



History

33 weeker intubated in delivery room for poor resp effort. initial glucose <

20. D10 bolus given. Mother was on Mag

Plan

Continue feeds ad aj min 30mL q3H

RESPIRATORY DISTRESS SYNDROME

Diagnosis                Start Date End Date

Pulmonary Immaturity     2018



History

33 weeker born via stat . Inadequate  steroids. intubated in

delivery room for poor resp effort s/p Infasurf x 1. extubated day 2 to CPAP,

transitioned to nasal cannula and weaned to room air day 5

Plan

Wean to room air as tolerated

PREMATURITY

Diagnosis                Start Date End Date

Prematurity 4839-4352 gm 2018



History

33 weeker born via  for preeclampsia and NRFHT, floppy at delivery

with significant metabolic acidosis. Normal neuro exam after volume

resuscitation and correction of hypoglycemia.  loaded with caffeine on day1.

improved acidosis < 12 hours.

Plan

developmentally appropriate care

d/c scheduled bili checks

ready for d/c when at least 1800g

HEALTH MAINTENANCE

MATERNAL LABS

RPR/Serology: Non-Reactive HIV: Negative Rubella: Immune GBS: Unknown

HBsAg: Negative



 SCREENING

Date                 Comment

2018 Ordered



Parental Contact

Updated



It is the opinion of the attending physician/provider that removal of the

indicated support would cause imminent or life threatening deterioration and

therefore result in significant morbidity or mortality.





_______________________________________

Good Carbajal MD

## 2018-01-01 NOTE — PHYSICIAN PROGRESS NOTE
DAILY NOTE



Name: RICARDO DOLAN                               Medical Record Number: [id number]

Note Date: 2018                             Date/Time: 2018 10:17:00



DOL: 4    Pos-Mens Age: 33wk 4d    Birth Gest: 33wk 0d      : 2018

Birth Weight: 1700 (gms)



DAILY PHYSICAL EXAM

Todays Weight: 1614 (gms)         Chg 24 hrs: --      Chg 7 days: --



Temperature   Heart Rate Resp Rate  BP - Sys   BP - Canas  BP - Mean  O2 Sats

98.8          136        36         64         36         45         95

Intensive cardiac and respiratory monitoring, continuous and/or frequent vital

sign monitoring.



Bed Type:      Radiant Warmer

General:       The infant is alert and active.

Head/Neck:     Anterior fontanelle is soft and flat. NC in place

Chest:         Clear, equal breath sounds.

Heart:         Regular rate and rhythm, without murmur. Pulses are normal.

Abdomen:       Distended abdomen, soft, normal bowel sounds. No

               hepatosplenomegaly. Normal bowel sounds.

Genitalia:     Normal external genitalia are present.

Extremities:   No deformities noted.

Neurologic:    Normal tone and activity.

Skin:          The skin is pink and well perfused.



RESPIRATORY SUPPORT

Respiratory Support      Start Date Stop Date  Dur(d) Comment

High Flow Nasal Cannula  2018 3

delivering CPAP

Nasal Cannula            2018            1



SETTINGS FOR NASAL CANNULA

FiO2           Flow (lpm)

0.21           1



SETTINGS FOR HIGH FLOW NASAL CANNULA DELIVERING CPAP

FiO2      Flow (lpm)

0.21      2



PROCEDURES

Procedures          Start Date Stop Date  Dur(d)  Clinician      Comment

Procedures

[facility]                 2018            5       Cheryl Cedillo   low lying

                                                  MD

Procedures

Volume Bolus        2018            5                      10mL/kg x 2.

                                                                 NS for

                                                                 metabolic

                                                                 acidosis



CULTURES

ACTIVE

Type            Date       Results        Organism       Comment:

Blood           2018 No Growth



INTAKE/OUTPUT

Fluid Type        Sinan/oz     Dex % Prot g/kg Prot g/100mL Amt  Comment

IV Fluids                    10                           94

Other - IV                                                12   meds and flushes

NeoSure           22                                      112



Route: PO



PLANNED INTAKE

FLUID TYPE: IV FLUIDS

Sinan/oz   Dex %    Prot g/kg Prot g/100mL Amt  mL/feed feeds/day mL/hr mL/kg/da

         10                              96                     4     59.48

FLUID TYPE: NEOSURE

Sinan/oz   Dex %    Prot g/kg Prot g/100mL Amt  mL/feed feeds/day mL/hr mL/kg/da

22                                       120  20      6               74.35





Comment or EBM

FLUID TYPE: SALINE - 1/2 NORMAL

Sinan/oz   Dex %    Prot g/kg Prot g/100mL Amt  mL/feed feeds/day mL/hr mL/kg/da

                                         12                     0.5   7



Urine Amount: 108 mL   2.8 mL/kg/hr

Calculation: 24 hrs



Total Output:

   108 mL    2.8 mL/kg/hr             66.9 mL/kg/day

Calculation: 24 hrs

Stools: 4





NUTRITIONAL SUPPORT

Diagnosis                Start Date End Date

Nutritional Support      2018



History

33 weeker intubated in delivery room for poor resp effort. initial glucose <

20. D10 bolus given. Mother was on Mag

Assessment

Has been tolerating feeds. this am abdominal girth increased by 2cm, however

soft, non tender, active bowel sounds

Plan

Continue feeds 20mL q4. Neosure /EBM. PO/NG - No increase today

IVF @ 140ml/kg/day

Monitor closely

RESPIRATORY DISTRESS SYNDROME

Diagnosis                Start Date End Date

Respiratory Distress     2018

Syndrome



History

33 weeker born via stat . Inadequate  steroids. intubated in

delivery room for poor resp effort s/p Infasurf x 1

Assessment

stable on 1L NC 21%

Plan

Monitor and wean HFNC as tolerated

[id number]

Diagnosis                Start Date End Date

[id number]-[id number] 2018



History

33 weeker born via  for preeclampsia and NRFHT, floppy at delivery

with significant metabolic acidosis- base def -17. : CBCd , no left shift.

improved clinical status after volume resuscitation, base deficit improved to

-7

Assessment

Amp and gent dced and stable

Plan

F/U bld cx till final

PREMATURITY

Diagnosis                Start Date End Date

Prematurity 2251-7826 gm 2018



History

33 weeker born via  for preeclampsia and NRFHT, floppy at delivery

with significant metabolic acidosis. Normal neuro exam after volume

resuscitation and correction of hypoglycemia.  loaded with caffeine on day1.

improved acidosis < 12 hours.

Assessment

tcb: 7.2

Plan

developmentally appropriate care

daily TCB and send serum if >8.5

HEALTH MAINTENANCE

MATERNAL LABS

RPR/Serology: Non-Reactive HIV: Negative Rubella: Immune GBS: Unknown

HBsAg: Negative



 SCREENING

Date                 Comment

2018 Ordered



Parental Contact

Updated





_______________________________________

Cheryl Cedillo MD

## 2018-01-01 NOTE — PHYSICIAN PROGRESS NOTE
DAILY NOTE



Name: RICARDO DOLAN                               Medical Record Number: [id number]

Note Date: 2018                             Date/Time: 2018 10:11:00



DOL: 1    Pos-Mens Age: 33wk 1d    Birth Gest: 33wk 0d      : 2018

Birth Weight: 1700 (gms)



DAILY PHYSICAL EXAM

Todays Weight: Deferred (gms)     Chg 24 hrs: --      Chg 7 days: --



Temperature   Heart Rate Resp Rate  BP - Sys   BP - Canas  BP - Mean  O2 Sats

98.2          130        84         61         36         44         97

Intensive cardiac and respiratory monitoring, continuous and/or frequent vital

sign monitoring.



Bed Type:      Radiant Warmer

General:       The infant is alert and active.

Head/Neck:     Anterior fontanelle is soft and flat. Intubated

Chest:         Clear, equal breath sounds. tachypnea, retractions

Heart:         Regular rate and rhythm, without murmur. Pulses are normal.

Abdomen:       Soft and flat. No hepatosplenomegaly. Normal bowel sounds.

Genitalia:     Normal external genitalia are present.

Extremities:   No deformities noted.

Neurologic:    Normal tone and activity.

Skin:          The skin is pink and well perfused.



MEDICATIONS

Active         Start Date Start Time      Stop Date  Dur(d) Comment

Ampicillin     2018                            2

Gentamicin     2018                            2



RESPIRATORY SUPPORT

Respiratory Support      Start Date Stop Date  Dur(d) Comment

Ventilator               2018            2



SETTINGS FOR VENTILATOR

Type     FiO2     Rate     PEEP     Vt

A/C      0.25     30       6        6.1



PROCEDURES

Procedures          Start Date Stop Date  Dur(d)  Clinician      Comment

Procedures



Procedures

[facility]                 2018            2       Cheryl Cedillo   low lying

                                                  MD

Procedures

Volume Bolus        2018            2                      10mL/kg x 2.

                                                                 NS for

                                                                 metabolic

                                                                 acidosis



LABS

CBC      Time  WBC     Hgb     Hct     Plts    Segs    Bands   Lymph   Mono

18 20:58 19.9 K/m14.3 gm/43.1 %  193 K/mm23.0 %  0 %     60.0 %  13.0 %

Eos     Baso    Imm     nRBC    Retic

        0 %             63.0 %



CULTURES

ACTIVE

Type            Date       Results        Organism       Comment:

Blood           2018 Pending



INTAKE/OUTPUT

Fluid Type        Sinan/oz     Dex % Prot g/kg Prot g/100mL Amt  Comment

IV Fluids                    10                           41.6

Other - IV                                                55.8 volume boluses



Weight Used for calculations: 1700 grams

Route: NPO



PLANNED INTAKE

FLUID TYPE: SALINE - 1/2 NORMAL

Sinan/oz   Dex %    Prot g/kg Prot g/100mL Amt  mL/feed feeds/day mL/hr mL/kg/da

                                         12                     0.5   7

FLUID TYPE: IV FLUIDS

Sinan/oz   Dex %    Prot g/kg Prot g/100mL Amt  mL/feed feeds/day mL/hr mL/kg/da

         10                              163.2                  6.8   96



Urine Amount: 96 mL   4.7 mL/kg/hr

Calculation: 12 hrs



Total Output:

   96 mL     2.4 mL/kg/hr             56.5 mL/kg/day

Calculation: 24 hrs

Stools: 0





NUTRITIONAL SUPPORT

Diagnosis                Start Date End Date

Nutritional Support      2018



History

33 weeker intubated in delivery room for poor resp effort. initial glucose <

20. D10 bolus given. Mother was on Mag

Assessment

resolved hypoglycemia, remains intubated, improved clinical status

Plan

Continue NPO for 24 hours

IVF @ 100ml/kg/day

RESPIRATORY DISTRESS SYNDROME

Diagnosis                Start Date End Date

Respiratory Distress     2018

Syndrome



History

33 weeker born via stat . Inadequate  steroids. intubated in

delivery room for poor resp effort s/p Infasurf x 1

Assessment

weaned to 25% FiO2. weaning on vent settings. alert and active

Plan

monitor

ABG and 7p and 7a

wean to extubation. plan to extubate to CPAP in am

[id number]

Diagnosis                Start Date End Date

Caybsd-dbhrydl-zghwwxfcx 2018



History

33 weeker born via  for preeclampsia and NRFHT, floppy at delivery

with significant metabolic acidosis- base def -17. : CBCd , no left shift.

improved clinical status after volume resuscitation, base deficit improved to

-7

Assessment

CBCd , no left shift. improved clinical status after volume resuscitation, base

deficit improved to -7

Plan

Cont amp and gent for prophylaxis

repeat cbcd and send crp after 24 hours

PREMATURITY

Diagnosis                Start Date End Date

Prematurity 9282-9398 gm 2018



History

33 weeker born via  for preeclampsia and NRFHT, floppy at delivery

with significant metabolic acidosis. Normal neuro exam after volume

resuscitation and correction of hypoglycemia.  loaded with caffeine on day1.

improved acidosis < 12 hours.

Plan

developmentally appropriate care

HEALTH MAINTENANCE

MATERNAL LABS

RPR/Serology: Non-Reactive HIV: Negative Rubella: Immune GBS: Unknown

HBsAg: Negative



 SCREENING

Date                 Comment

2018 Ordered



Parental Contact

Updated at the bedside





_______________________________________

Cheryl Cedillo MD

## 2018-01-01 NOTE — PHYSICIAN PROGRESS NOTE
DAILY NOTE



Name: RICARDO DOLAN                               Medical Record Number: [id number]

Note Date: 2018                             Date/Time: 2018 10:58:00



DOL: 12   Pos-Mens Age: 34wk 5d    Birth Gest: 33wk 0d      : 2018

Birth Weight: 1700 (gms)



DAILY PHYSICAL EXAM

Todays Weight: Deferred (gms)     Chg 24 hrs: --      Chg 7 days: --



Temperature   Heart Rate Resp Rate  BP - Sys   BP - Canas  BP - Mean  O2 Sats

98.9          150        56         73         47         55         97

Intensive cardiac and respiratory monitoring, continuous and/or frequent vital

sign monitoring.



Bed Type:      Open Crib

General:       The infant is alert and active.

Head/Neck:     Anterior fontanelle is soft and flat. NG in place

Chest:         Clear, equal breath sounds.

Heart:         Regular rate and rhythm, without murmur. Pulses are normal.

Abdomen:       Soft and flat. No hepatosplenomegaly. Normal bowel sounds.

Genitalia:     Normal external genitalia are present.

Extremities:   No deformities noted.

Neurologic:    Normal tone and activity.

Skin:          The skin is pink and well perfused.



MEDICATIONS

Active         Start Date Start Time      Stop Date  Dur(d) Comment

ADEK           2018                            8



RESPIRATORY SUPPORT

Respiratory Support      Start Date Stop Date  Dur(d) Comment

Room Air                 2018            8



PROCEDURES

Procedures          Start Date Stop Date  Dur(d)  Clinician      Comment

Procedures



Procedures



Procedures

[facility]                 2018 6       Cheryl Cedillo   low lying

                                                  MD

Procedures

Volume Bolus        2018 1                      10mL/kg x 2.

                                                                 NS for

                                                                 metabolic

                                                                 acidosis



CULTURES

ACTIVE

Type            Date       Results        Organism       Comment:

Blood           2018 No Growth



INTAKE/OUTPUT

Fluid Type        Sinan/oz     Dex % Prot g/kg Prot g/100mL Amt  Comment

NeoSure           22                                      281



Weight Used for calculations: 1730 grams

Route: NG/PO



PLANNED INTAKE

FLUID TYPE: NEOSURE

Sinan/oz   Dex %    Prot g/kg Prot g/100mL Amt  mL/feed feeds/day mL/hr mL/kg/da

22                                       272  34      8               157



Number of Voids: 8



Total Output:

Stools: 5 Last Stool: 2018





NUTRITIONAL SUPPORT

Diagnosis                Start Date End Date

Nutritional Support      2018



History

33 weeker intubated in delivery room for poor resp effort. initial glucose <

20. D10 bolus given. Mother was on Mag

Assessment

tolerating feeds. 40% PO over the past 24 hours

Plan

Continue feeds ad aj min 34mL q3H (160cc/kg/day)

RESPIRATORY DISTRESS SYNDROME

Diagnosis                Start Date End Date

Pulmonary Immaturity     2018



History

33 weeker born via stat . Inadequate  steroids. intubated in

delivery room for poor resp effort s/p Infasurf x 1. extubated day 2 to CPAP,

transitioned to nasal cannula and weaned to room air day 5

Assessment

No events over 24 hours

Plan

Continue to monitor

PREMATURITY

Diagnosis                Start Date End Date

Prematurity 9903-1125 gm 2018



History

33 weeker born via  for preeclampsia and NRFHT, floppy at delivery

with significant metabolic acidosis. Normal neuro exam after volume

resuscitation and correction of hypoglycemia.  loaded with caffeine on day1.

improved acidosis < 12 hours.

Assessment

stable in room air. working on PO feeds

Plan

developmentally appropriate care

HEALTH MAINTENANCE

MATERNAL LABS

RPR/Serology: Non-Reactive HIV: Negative Rubella: Immune GBS: Unknown

HBsAg: Negative



 SCREENING

Date                 Comment

2018 Ordered



HEARING SCREEN

Date                Type      Results   Comment

2018 Done               Passed



Parental Contact

Updated





_______________________________________

Cheryl Cedillo MD

## 2018-01-01 NOTE — PHYSICIAN PROGRESS NOTE
DAILY NOTE



Name: RICARDO DOLAN                               Medical Record Number: [id number]

Note Date: 2018                             Date/Time: 2018 10:58:00



DOL: 17   Pos-Mens Age: 35wk 3d    Birth Gest: 33wk 0d      : 2018

Birth Weight: 1700 (gms)



DAILY PHYSICAL EXAM

Todays Weight: 1807 (gms)         Chg 24 hrs: --      Chg 7 days: 110



Temperature   Heart Rate Resp Rate  BP - Sys   BP - Canas  BP - Mean  O2 Sats

98.5          162        50         75         44         54         97

Intensive cardiac and respiratory monitoring, continuous and/or frequent vital

sign monitoring.



Bed Type:      Open Crib

General:       The infant is alert and active.

Head/Neck:     Anterior fontanelle is soft and flat.

Chest:         Clear, equal breath sounds.

Heart:         Regular rate and rhythm, without murmur. Pulses are normal.

Abdomen:       Soft and flat. No hepatosplenomegaly. Normal bowel sounds.

Genitalia:     Normal external genitalia are present.

Extremities:   No deformities noted.  Normal range of motion for all

               extremities.

Neurologic:    Normal tone and activity.

Skin:          The skin is pink and well perfused.



MEDICATIONS

Active         Start Date Start Time      Stop Date  Dur(d) Comment

Multivitamins  2018                            5

with Iron



RESPIRATORY SUPPORT

Respiratory Support      Start Date Stop Date  Dur(d) Comment

Room Air                 2018            13



PROCEDURES

Procedures          Start Date Stop Date  Dur(d)  Clinician      Comment

Procedures



Procedures



Procedures

[facility]                 2018 6       Cheryl Cedillo   low lying

                                                  MD

Procedures

Volume Bolus        2018 1                      10mL/kg x 2.

                                                                 NS for

                                                                 metabolic

                                                                 acidosis



CULTURES

ACTIVE

Type            Date       Results        Organism       Comment:

Blood           2018 No Growth



INTAKE/OUTPUT

Fluid Type        Sinan/oz     Dex % Prot g/kg Prot g/100mL Amt  Comment

NeoSure           22                                      298





Number of Voids: 8



Total Output:

Stools: 2 Last Stool: 2018





NUTRITIONAL SUPPORT

Diagnosis                Start Date End Date

Nutritional Support      2018



History

33 weeker intubated in delivery room for poor resp effort. initial glucose <

20. D10 bolus given. Mother was on Mag

Assessment

tolerating feeds. 100% PO over the past 24 hours

Plan

Continue feeds ad aj min 34mL q3H

RESPIRATORY DISTRESS SYNDROME

Diagnosis                Start Date End Date

Pulmonary Immaturity     2018



History

33 weeker born via stat . Inadequate  steroids. intubated in

delivery room for poor resp effort s/p Infasurf x 1. extubated day 2 to CPAP,

transitioned to nasal cannula and weaned to room air day 5

Assessment

No events over 24 hours. Last andrés 

Plan

Continue to monitor

PREMATURITY

Diagnosis                Start Date End Date

Prematurity 4913-1694 gm 2018



History

33 weeker born via  for preeclampsia and NRFHT, floppy at delivery

with significant metabolic acidosis. Normal neuro exam after volume

resuscitation and correction of hypoglycemia.  loaded with caffeine on day1.

improved acidosis < 12 hours.

Assessment

stable in room air. working on PO feeds

Plan

developmentally appropriate care

HEALTH MAINTENANCE

MATERNAL LABS

RPR/Serology: Non-Reactive HIV: Negative Rubella: Immune GBS: Unknown

HBsAg: Negative



 SCREENING

Date                 Comment

2018 Ordered



HEARING SCREEN

Date                Type      Results   Comment

2018 Done               Passed



IMMUNIZATION

Date                Type                Comment

2018 Ordered  Hepatitis B



Parental Contact

Updated





_______________________________________

Raghavendra Love MD

## 2018-01-01 NOTE — PHYSICIAN PROGRESS NOTE
DAILY NOTE



Name: RICARDO DOLAN                               Medical Record Number: [id number]

Note Date: 2018                             Date/Time: 2018 11:54:00



DOL: 14   Pos-Mens Age: 35wk 0d    Birth Gest: 33wk 0d      : 2018

Birth Weight: 1700 (gms)



DAILY PHYSICAL EXAM

Todays Weight: Deferred (gms)     Chg 24 hrs: --      Chg 7 days: --



Temperature   Heart Rate Resp Rate  BP - Sys   BP - Canas  BP - Mean  O2 Sats

98.3          156        37         46         24         31         100

Intensive cardiac and respiratory monitoring, continuous and/or frequent vital

sign monitoring.



Bed Type:      Open Crib

General:       The infant is alert and active.

Head/Neck:     Anterior fontanelle is soft and flat. NG in place

Chest:         Clear, equal breath sounds.

Heart:         Regular rate and rhythm, without murmur. Pulses are normal.

Abdomen:       Soft and flat. No hepatosplenomegaly. Normal bowel sounds.

Genitalia:     Normal external genitalia are present.

Extremities:   No deformities noted.

Neurologic:    Normal tone and activity.

Skin:          The skin is pink and well perfused.



MEDICATIONS

Active         Start Date Start Time      Stop Date  Dur(d) Comment

Multivitamins  2018                            2

with Iron



RESPIRATORY SUPPORT

Respiratory Support      Start Date Stop Date  Dur(d) Comment

Room Air                 2018            10



PROCEDURES

Procedures          Start Date Stop Date  Dur(d)  Clinician      Comment

Procedures



Procedures



Procedures

UVC                 2018 6       Cheryl Cedillo   low lying

                                                  MD

Procedures

Volume Bolus        2018 1                      10mL/kg x 2.

                                                                 NS for

                                                                 metabolic

                                                                 acidosis



CULTURES

ACTIVE

Type            Date       Results        Organism       Comment:

Blood           2018 No Growth



INTAKE/OUTPUT

Fluid Type        Sinan/oz     Dex % Prot g/kg Prot g/100mL Amt  Comment

NeoSure           22                                      272



Weight Used for calculations: 1737 grams

Route: NG/PO



PLANNED INTAKE

FLUID TYPE: NEOSURE

Sinan/oz   Dex %    Prot g/kg Prot g/100mL Amt  mL/feed feeds/day mL/hr mL/kg/da

22                                       272  34      8               156



Number of Voids: 8



Total Output:

Stools: 5 Last Stool: 2018





NUTRITIONAL SUPPORT

Diagnosis                Start Date End Date

Nutritional Support      2018



History

33 weeker intubated in delivery room for poor resp effort. initial glucose <

20. D10 bolus given. Mother was on Mag

Assessment

tolerating feeds. 45% PO over the past 24 hours

Plan

Continue feeds ad aj min 34mL q3H

RESPIRATORY DISTRESS SYNDROME

Diagnosis                Start Date End Date

Pulmonary Immaturity     2018



History

33 weeker born via stat . Inadequate  steroids. intubated in

delivery room for poor resp effort s/p Infasurf x 1. extubated day 2 to CPAP,

transitioned to nasal cannula and weaned to room air day 5

Assessment

No events over 24 hours

Plan

Continue to monitor

PREMATURITY

Diagnosis                Start Date End Date

Prematurity 5356-5191 gm 2018



History

33 weeker born via  for preeclampsia and NRFHT, floppy at delivery

with significant metabolic acidosis. Normal neuro exam after volume

resuscitation and correction of hypoglycemia.  loaded with caffeine on day1.

improved acidosis < 12 hours.

Assessment

stable in room air. working on PO feeds

Plan

developmentally appropriate care

HEALTH MAINTENANCE

MATERNAL LABS

RPR/Serology: Non-Reactive HIV: Negative Rubella: Immune GBS: Unknown

HBsAg: Negative



 SCREENING

Date                 Comment

2018 Ordered



HEARING SCREEN

Date                Type      Results   Comment

2018 Done               Passed



Parental Contact

Updated





_______________________________________

Cheryl Cedillo MD

## 2018-01-01 NOTE — PHYSICIAN PROGRESS NOTE
DAILY NOTE



Name: RICARDO DOLAN                               Medical Record Number: [id number]

Note Date: 2018                             Date/Time: 2018 10:12:00



DOL: 13   Pos-Mens Age: 34wk 6d    Birth Gest: 33wk 0d      : 2018

Birth Weight: 1700 (gms)



DAILY PHYSICAL EXAM

Todays Weight: 1737 (gms)         Chg 24 hrs: --      Chg 7 days: 69



Temperature   Heart Rate Resp Rate  BP - Sys   BP - Canas  BP - Mean  O2 Sats

99.1          172        40         62         33         42         99

Intensive cardiac and respiratory monitoring, continuous and/or frequent vital

sign monitoring.



Bed Type:      Open Crib

General:       The infant is alert and active.

Head/Neck:     Anterior fontanelle is soft and flat.

Chest:         Clear, equal breath sounds.

Heart:         Regular rate and rhythm, without murmur. Pulses are normal.

Abdomen:       Soft and flat. No hepatosplenomegaly. Normal bowel sounds.

Genitalia:     Normal external genitalia are present.

Extremities:   No deformities noted.

Neurologic:    Normal tone and activity.

Skin:          The skin is pink and well perfused.



MEDICATIONS

Active         Start Date Start Time      Stop Date  Dur(d) Comment

ADEK           2018 9

Multivitamins  2018                            1

with Iron



RESPIRATORY SUPPORT

Respiratory Support      Start Date Stop Date  Dur(d) Comment

Room Air                 2018            9



PROCEDURES

Procedures          Start Date Stop Date  Dur(d)  Clinician      Comment

Procedures



Procedures



Procedures

UVC                 2018 6       Cheryl Cedillo   low lying

                                                  MD

Procedures

Volume Bolus        2018 1                      10mL/kg x 2.

                                                                 NS for

                                                                 metabolic

                                                                 acidosis



CULTURES

ACTIVE

Type            Date       Results        Organism       Comment:

Blood           2018 No Growth



INTAKE/OUTPUT

Fluid Type        Sinan/oz     Dex % Prot g/kg Prot g/100mL Amt  Comment

NeoSure           22                                      272



Route: NG/PO



PLANNED INTAKE

FLUID TYPE: NEOSURE

Sinan/oz   Dex %    Prot g/kg Prot g/100mL Amt  mL/feed feeds/day mL/hr mL/kg/da

22                                       272  34      8               156



Number of Voids: 8



Total Output:

Stools: 5 Last Stool: 2018





NUTRITIONAL SUPPORT

Diagnosis                Start Date End Date

Nutritional Support      2018



History

33 weeker intubated in delivery room for poor resp effort. initial glucose <

20. D10 bolus given. Mother was on Mag

Assessment

tolerating feeds. 90% PO over the past 24 hours

Plan

Continue feeds ad aj min 34mL q3H

RESPIRATORY DISTRESS SYNDROME

Diagnosis                Start Date End Date

Pulmonary Immaturity     2018



History

33 weeker born via stat . Inadequate  steroids. intubated in

delivery room for poor resp effort s/p Infasurf x 1. extubated day 2 to CPAP,

transitioned to nasal cannula and weaned to room air day 5

Assessment

No events over 24 hours

Plan

Continue to monitor

PREMATURITY

Diagnosis                Start Date End Date

Prematurity 7361-2004 gm 2018



History

33 weeker born via  for preeclampsia and NRFHT, floppy at delivery

with significant metabolic acidosis. Normal neuro exam after volume

resuscitation and correction of hypoglycemia.  loaded with caffeine on day1.

improved acidosis < 12 hours.

Assessment

stable in room air. working on PO feeds

Plan

developmentally appropriate care

HEALTH MAINTENANCE

MATERNAL LABS

RPR/Serology: Non-Reactive HIV: Negative Rubella: Immune GBS: Unknown

HBsAg: Negative



 SCREENING

Date                 Comment

2018 Ordered



HEARING SCREEN

Date                Type      Results   Comment

2018 Done               Passed



Parental Contact

Updated





_______________________________________

Cheryl Cedillo MD

## 2018-01-01 NOTE — DISCHARGE SUMMARY
DISCHARGE SUMMARY



Name: RICARDO DOLAN                               Medical Record Number: [id number]

Admit Date: 2018                                Discharge Date: 2018

YOB: 2018

Birth Gestation: 33wk 0d                DOL: 18

Birth Weight: 1700 (gms)  11-25%tile    Birth Head Circ: 30 (cm)  26-50%tile

Birth Length: 43.2 (cm)  26-50%tile

Disposition: Discharged

 Patient discharged home in mothers care.



Discharge Weight: 1873 (gms)                     Discharge Head Circ: 30.5 (cm)

Discharge Length: 44.5 (cm)                      Discharge Pos-Mens Age: 35wk 4d



DISCHARGE FOLLOWUP

Followup Name            Comment                                 Appointment

PCP                      In 2-3 days





DISCHARGE RESPIRATORY SUPPORT

Respiratory Support Start Date Stop Date  Dur(d) Comment

Room Air            2018            14



DISCHARGE MEDICATIONS

Multivitamins with Iron  2018



DISCHARGE FLUIDS

NeoSure



 SCREENING

Date                 Comment

2018 Ordered



HEARING SCREEN

Date                Type      Results   Comment

2018 Done               Passed



IMMUNIZATIONS

Date                  Type           Comment

2018 Ordered    Hepatitis B



ACTIVE DIAGNOSES

Diagnosis                Start Date Comment

Nutritional Support      2018

Prematurity 2842-3991 gm 2018

Pulmonary Immaturity     2018



RESOLVED  DIAGNOSES

Diagnosis                Start Date Comment

Respiratory Distress     2018

Syndrome

[id number]-sngzcvpif 2018



MATERNAL HISTORY

Moms Age: 17  Race: Black    Blood Type: AB Pos

      P: 0

RPR/Serology: Non-Reactive    HIV: Negative  Rubella: Immune

GBS: Unknown                  HBsAg: Negative

EDC - OB: 2018          Prenatal Care: Yes  Moms MR#: [id number]

Moms First Name: Alyson Woods Last Name: Miguelangel



Complications during Pregnancy, Labor or Delivery: Yes



Name                Comment

NRFHT

Pre-eclampsia



Maternal Steroids: Yes



Most Recent Dose: Date: 2018 Time: 18:10 Next Recent Dose: Date:  Time:



Medications During Pregnancy or Labor: Yes



Name                                    Comment

Cefazolin

Magnesium Sulfate



DELIVERY

YOB: 2018 Time of Birth: 19:08 Live Births: Single

Birth Order: Single ROM Prior to Delivery: No Fluid at Delivery: Clear

Birth Hospital: [facility] Presentation: Vertex

Anesthesia: Spinal Delivery Type:  Section



Procedures/Medications at Delivery:NP/OP Suctioning,



                    Start Date Stop Date  Clinician      Comment

Intubation          2018            XXX XXX, MD    RT

Positive Pressure Ve2018 XXX MD KENDRICK    RT



APGAR:  1 min: 0 5  min: 4 10  min: 6



Others at Delivery:       Resuscitation team



Labor and Delivery Comment:

Intubatedin delivery room for poor tone, cyanosis and HR < 100



DISCHARGE PHYSICAL EXAM

Temperature   Heart Rate Resp Rate  BP - Sys   BP - Canas  BP - Mean  O2 Sats

98.8          154        37         72         36         48         100



Bed Type:      Open Crib

General:       The infant is alert and active.

Head/Neck:     Anterior fontanelle is soft and flat.

Chest:         Clear, equal breath sounds.

Heart:         Regular rate and rhythm, without murmur. Pulses are normal.

Abdomen:       Soft and flat. No hepatosplenomegaly. Normal bowel sounds.

Genitalia:     Normal external genitalia are present.

Extremities:   No deformities noted.  Normal range of motion for all

               extremities.

Neurologic:    Normal tone and activity.

Skin:          The skin is pink and well perfused.



NUTRITIONAL SUPPORT

Diagnosis                Start Date End Date

Nutritional Support      2018



History

33 weeker intubated in delivery room for poor resp effort. initial glucose <

20. D10 bolus given. Mother was on Mag

Assessment

tolerating feeds. 100% PO over the past 72 hours

Plan

Continue feeds ad aj min 34mL q3H

RESPIRATORY DISTRESS SYNDROME

Diagnosis                Start Date End Date

Respiratory Distress     2018

Syndrome

Pulmonary Immaturity     2018



History

33 weeker born via stat . Inadequate  steroids. intubated in

delivery room for poor resp effort s/p Infasurf x 1. extubated day 2 to CPAP,

transitioned to nasal cannula and weaned to room air day 5

Assessment

No events over 24 hours. Last andrés 

Plan

Continue to monitor

[id number]

Diagnosis                Start Date End Date

Hyjbak-rqctwop-pmpncmiok 2018



History

33 weeker born via  for preeclampsia and NRFHT, floppy at delivery

with significant metabolic acidosis- base def -17. : CBCd , no left shift.

improved clinical status after volume resuscitation, base deficit improved to

-7. Blood cx neg. Off antibiotics and stable clinical status

Assessment

Blood culture no growth after 5 days

Plan

Monitor clinically

PREMATURITY

Diagnosis                Start Date End Date

Prematurity 2371-8010 gm 2018



History

33 weeker born via  for preeclampsia and NRFHT, floppy at delivery

with significant metabolic acidosis. Normal neuro exam after volume

resuscitation and correction of hypoglycemia.  loaded with caffeine on day1.

improved acidosis < 12 hours.

Assessment

stable in room air and in an open crib

Plan

developmentally appropriate care

RESPIRATORY SUPPORT

Respiratory Support      Start Date Stop Date  Dur(d) Comment

Ventilator               2018 2

Nasal CPAP               2018 2

High Flow Nasal Cannula  2018 3

delivering CPAP

Nasal Cannula            2018 2

Room Air                 2018            14



PROCEDURES

Procedures          Start Date Stop Date  Dur(d)  Clinician      Comment

Procedures



Procedures



Procedures

UVC                 2018 6       james Figueroa lying

                                                  MD

Procedures

Volume Bolus        2018 1                      10mL/kg x 2.

                                                                 NS for

                                                                 metabolic

                                                                 acidosis



CULTURES

ACTIVE

Type            Date       Results        Organism       Comment:

Blood           2018 No Growth



INTAKE/OUTPUT

Fluid Type        Fanny/oz     Dex % Prot g/kg Prot g/100mL Amt  Comment

NeoSure           22                                      283





ACTUAL FLUID CALCULATIONS

Total      Total      Ent        IVF        IV Gluc     Total Prot  Total Fat

ml/kg      fanny/kg     ml/kg      ml/kg      mg/kg/min   g/kg        g/kg

151        110        151        0          0           3.17        6.19



Number of Voids: 9



Total Output:

Stools: 4 Last Stool: 2018





MEDICATIONS

Active         Start Date Start Time      Stop Date  Dur(d) Comment

Multivitamins  2018                            6

with Iron



Inactive       Start Date Start Time      Stop Date  Dur(d) Comment

Infasurf       2018            Once 2018 1

Erythromycin   2018            Once 2018 1

Eye Ointment

Vitamin K      2018            Once 2018 1

Ampicillin     2018 4

Gentamicin     2018 4

Caffeine       2018            Once 2018 1

Citrate

ADEK           2018 9





Parental Contact

Updated



Time spent preparing and implementing Discharge:> 30 min





_______________________________________

Raghavendra Love MD

## 2018-01-01 NOTE — PHYSICIAN PROGRESS NOTE
DAILY NOTE



Name: RICARDO DOLAN                               Medical Record Number: [id number]

Note Date: 2018                             Date/Time: 2018 10:35:00



DOL: 15   Pos-Mens Age: 35wk 1d    Birth Gest: 33wk 0d      : 2018

Birth Weight: 1700 (gms)



DAILY PHYSICAL EXAM

Todays Weight: Deferred (gms)     Chg 24 hrs: --      Chg 7 days: --



Temperature   Heart Rate Resp Rate  BP - Sys   BP - Canas  BP - Mean  O2 Sats

98.2          153        22         78         42         54         100

Intensive cardiac and respiratory monitoring, continuous and/or frequent vital

sign monitoring.



Bed Type:      Open Crib

General:       The infant is alert and active.

Head/Neck:     Anterior fontanelle is soft and flat. NG in place

Chest:         Clear, equal breath sounds.

Heart:         Regular rate and rhythm, without murmur. Pulses are normal.

Abdomen:       Soft and flat. No hepatosplenomegaly. Normal bowel sounds.

Genitalia:     Normal external genitalia are present.

Extremities:   No deformities noted.

Neurologic:    Normal tone and activity.

Skin:          The skin is pink and well perfused.



MEDICATIONS

Active         Start Date Start Time      Stop Date  Dur(d) Comment

Multivitamins  2018                            3

with Iron



RESPIRATORY SUPPORT

Respiratory Support      Start Date Stop Date  Dur(d) Comment

Room Air                 2018            11



PROCEDURES

Procedures          Start Date Stop Date  Dur(d)  Clinician      Comment

Procedures



Procedures



Procedures

UVC                 2018 6       Cheryl Cedillo   low lying

                                                  MD

Procedures

Volume Bolus        2018 1                      10mL/kg x 2.

                                                                 NS for

                                                                 metabolic

                                                                 acidosis



CULTURES

ACTIVE

Type            Date       Results        Organism       Comment:

Blood           2018 No Growth



INTAKE/OUTPUT

Fluid Type        Sinan/oz     Dex % Prot g/kg Prot g/100mL Amt  Comment

NeoSure           22                                      272



Weight Used for calculations: 1737 grams



Number of Voids: 9



Total Output:

Stools: 8 Last Stool: 2018





NUTRITIONAL SUPPORT

Diagnosis                Start Date End Date

Nutritional Support      2018



History

33 weeker intubated in delivery room for poor resp effort. initial glucose <

20. D10 bolus given. Mother was on Mag

Assessment

tolerating feeds. 60% PO over the past 24 hours

Plan

Continue feeds ad aj min 34mL q3H

RESPIRATORY DISTRESS SYNDROME

Diagnosis                Start Date End Date

Pulmonary Immaturity     2018



History

33 weeker born via stat . Inadequate  steroids. intubated in

delivery room for poor resp effort s/p Infasurf x 1. extubated day 2 to CPAP,

transitioned to nasal cannula and weaned to room air day 5

Assessment

No events over 24 hours

Plan

Continue to monitor

PREMATURITY

Diagnosis                Start Date End Date

Prematurity 3113-8610 gm 2018



History

33 weeker born via  for preeclampsia and NRFHT, floppy at delivery

with significant metabolic acidosis. Normal neuro exam after volume

resuscitation and correction of hypoglycemia.  loaded with caffeine on day1.

improved acidosis < 12 hours.

Assessment

stable in room air. working on PO feeds

Plan

developmentally appropriate care

HEALTH MAINTENANCE

MATERNAL LABS

RPR/Serology: Non-Reactive HIV: Negative Rubella: Immune GBS: Unknown

HBsAg: Negative



 SCREENING

Date                 Comment

2018 Ordered



HEARING SCREEN

Date                Type      Results   Comment

2018 Done               Passed



Parental Contact

Updated





_______________________________________

Cheryl Cedillo MD

## 2018-01-01 NOTE — PHYSICIAN PROGRESS NOTE
DAILY NOTE



Name: RICARDO DOLAN                               Medical Record Number: [id number]

Note Date: 2018                             Date/Time: 2018 11:12:00



DOL: 11   Pos-Mens Age: 34wk 4d    Birth Gest: 33wk 0d      : 2018

Birth Weight: 1700 (gms)



DAILY PHYSICAL EXAM

Todays Weight: 1730 (gms)         Chg 24 hrs: 33      Chg 7 days: 116

Length: 43.2 (cm)   Change: 0 (cm)



Temperature   Heart Rate Resp Rate  BP - Sys   BP - Canas  BP - Mean  O2 Sats

99.2          165        56         58         39         45         99

Intensive cardiac and respiratory monitoring, continuous and/or frequent vital

sign monitoring.



Bed Type:      Open Crib

General:       The infant is alert and active.

Head/Neck:     Anterior fontanelle is soft and flat. NG in place

Chest:         Clear, equal breath sounds.

Heart:         Regular rate and rhythm, without murmur. Pulses are normal.

Abdomen:       Soft and flat. No hepatosplenomegaly. Normal bowel sounds.

Genitalia:     Normal external genitalia are present.

Extremities:   No deformities noted.

Neurologic:    Normal tone and activity.

Skin:          The skin is pink and well perfused.



MEDICATIONS

Active         Start Date Start Time      Stop Date  Dur(d) Comment

ADEK           2018                            7



RESPIRATORY SUPPORT

Respiratory Support      Start Date Stop Date  Dur(d) Comment

Room Air                 2018            7



PROCEDURES

Procedures          Start Date Stop Date  Dur(d)  Clinician      Comment

Procedures



Procedures



Procedures

UVC                 2018 6       Cheryl Cedillo   low lying

                                                  MD

Procedures

Volume Bolus        2018 1                      10mL/kg x 2.

                                                                 NS for

                                                                 metabolic

                                                                 acidosis



CULTURES

ACTIVE

Type            Date       Results        Organism       Comment:

Blood           2018 No Growth



INTAKE/OUTPUT

Fluid Type        Sinan/oz     Dex % Prot g/kg Prot g/100mL Amt  Comment

NeoSure           22                                      274



Route: NG/PO



PLANNED INTAKE

FLUID TYPE: NEOSURE

Sinan/oz   Dex %    Prot g/kg Prot g/100mL Amt  mL/feed feeds/day mL/hr mL/kg/da

22                                       272  34      8               157.23



Total Output:

Last Stool: 2018





NUTRITIONAL SUPPORT

Diagnosis                Start Date End Date

Nutritional Support      2018



History

33 weeker intubated in delivery room for poor resp effort. initial glucose <

20. D10 bolus given. Mother was on Mag

Assessment

tolerating feeds. 20% PO over the past 24 hours

Plan

Continue feeds ad aj min 34mL q3H (160cc/kg/day)

RESPIRATORY DISTRESS SYNDROME

Diagnosis                Start Date End Date

Pulmonary Immaturity     2018



History

33 weeker born via stat . Inadequate  steroids. intubated in

delivery room for poor resp effort s/p Infasurf x 1. extubated day 2 to CPAP,

transitioned to nasal cannula and weaned to room air day 5

Assessment

2 Bs 1D - self resolved

Plan

Continue to monitor

PREMATURITY

Diagnosis                Start Date End Date

Prematurity 4361-1924 gm 2018



History

33 weeker born via  for preeclampsia and NRFHT, floppy at delivery

with significant metabolic acidosis. Normal neuro exam after volume

resuscitation and correction of hypoglycemia.  loaded with caffeine on day1.

improved acidosis < 12 hours.

Assessment

stable in room air. working on PO feeds

Plan

developmentally appropriate care

HEALTH MAINTENANCE

MATERNAL LABS

RPR/Serology: Non-Reactive HIV: Negative Rubella: Immune GBS: Unknown

HBsAg: Negative



 SCREENING

Date                 Comment

2018 Ordered



Parental Contact

Updated





_______________________________________

Cheryl Cedillo MD

## 2018-01-01 NOTE — PHYSICIAN PROGRESS NOTE
DAILY NOTE



Name: RICARDO DOLAN                               Medical Record Number: [id number]

Note Date: 2018                             Date/Time: 2018 10:57:00



DOL: 7    Pos-Mens Age: 34wk 0d    Birth Gest: 33wk 0d      : 2018

Birth Weight: 1700 (gms)



DAILY PHYSICAL EXAM

Todays Weight: Deferred (gms)     Chg 24 hrs: --      Chg 7 days: --



Temperature   Heart Rate Resp Rate  BP - Sys   BP - Canas  BP - Mean  O2 Sats

97.9          136        40         76         37         50         99

Intensive cardiac and respiratory monitoring, continuous and/or frequent vital

sign monitoring.



Bed Type:      Open Crib

General:       The infant is alert and active.

Head/Neck:     Anterior fontanelle is soft and flat.

Chest:         Clear, equal breath sounds.

Heart:         Regular rate and rhythm, without murmur. Pulses are normal.

Abdomen:       Soft and flat. No hepatosplenomegaly. Normal bowel sounds.

Genitalia:     Normal external genitalia are present.

Extremities:   No deformities noted.

Neurologic:    Normal tone and activity.

Skin:          The skin is pink and well perfused.



MEDICATIONS

Active         Start Date Start Time      Stop Date  Dur(d) Comment

ADEK           2018                            3



RESPIRATORY SUPPORT

Respiratory Support      Start Date Stop Date  Dur(d) Comment

Room Air                 2018            3



PROCEDURES

Procedures          Start Date Stop Date  Dur(d)  Clinician      Comment

Procedures



Procedures



Procedures

[facility]                 2018 6       Cheryl Cedillo   low lying

                                                  MD

Procedures

Volume Bolus        2018 1                      10mL/kg x 2.

                                                                 NS for

                                                                 metabolic

                                                                 acidosis



CULTURES

ACTIVE

Type            Date       Results        Organism       Comment:

Blood           2018 No Growth



INTAKE/OUTPUT

Fluid Type        Sinan/oz     Dex % Prot g/kg Prot g/100mL Amt  Comment

NeoSure           22                                      237



Weight Used for calculations: 1668 grams

Route: PO



PLANNED INTAKE

FLUID TYPE: NEOSURE

Sinan/oz   Dex %    Prot g/kg Prot g/100mL Amt  mL/feed feeds/day mL/hr mL/kg/da

22                                       240  30      8               143





Comment ad aj min 30mL q3H or EBM



Number of Voids: 8



Total Output:

Stools: 5





NUTRITIONAL SUPPORT

Diagnosis                Start Date End Date

Nutritional Support      2018



History

33 weeker intubated in delivery room for poor resp effort. initial glucose <

20. D10 bolus given. Mother was on Mag

Assessment

tolerated feeds withut any issues overnight

Plan

Continue feeds ad aj min 30mL q3H

RESPIRATORY DISTRESS SYNDROME

Diagnosis                Start Date End Date

Pulmonary Immaturity     2018



History

33 weeker born via stat . Inadequate  steroids. intubated in

delivery room for poor resp effort s/p Infasurf x 1. extubated day 2 to CPAP,

transitioned to nasal cannula and weaned to room air day 5

Assessment

No events in room air

Plan

Wean to room air as tolerated

[id number]

Diagnosis                Start Date End Date

[id number] 2018



History

33 weeker born via  for preeclampsia and NRFHT, floppy at delivery

with significant metabolic acidosis- base def -17. : CBCd , no left shift.

improved clinical status after volume resuscitation, base deficit improved to

-7. Blood cx neg. Off antibiotics and stable clinical status

Assessment

bld cx neg - final

Plan

F/U bld cx till final

PREMATURITY

Diagnosis                Start Date End Date

Prematurity 9425-1624 gm 2018



History

33 weeker born via  for preeclampsia and NRFHT, floppy at delivery

with significant metabolic acidosis. Normal neuro exam after volume

resuscitation and correction of hypoglycemia.  loaded with caffeine on day1.

improved acidosis < 12 hours.

Assessment

bili this am 5.7  - low risk

Plan

developmentally appropriate care

d/c scheduled bili checks

ready for d/c when at least 1800g

HEALTH MAINTENANCE

MATERNAL LABS

RPR/Serology: Non-Reactive HIV: Negative Rubella: Immune GBS: Unknown

HBsAg: Negative



 SCREENING

Date                 Comment

2018 Ordered



Parental Contact

Updated





_______________________________________

Cheryl Cedillo MD